# Patient Record
Sex: FEMALE | Race: WHITE | NOT HISPANIC OR LATINO | URBAN - METROPOLITAN AREA
[De-identification: names, ages, dates, MRNs, and addresses within clinical notes are randomized per-mention and may not be internally consistent; named-entity substitution may affect disease eponyms.]

---

## 2021-05-10 ENCOUNTER — OFFICE VISIT (OUTPATIENT)
Dept: URBAN - METROPOLITAN AREA CLINIC 78 | Facility: CLINIC | Age: 71
End: 2021-05-10
Payer: MEDICARE

## 2021-05-10 ENCOUNTER — DASHBOARD ENCOUNTERS (OUTPATIENT)
Age: 71
End: 2021-05-10

## 2021-05-10 DIAGNOSIS — R14.0 BLOATING: ICD-10-CM

## 2021-05-10 DIAGNOSIS — F41.9 ANXIETY: ICD-10-CM

## 2021-05-10 DIAGNOSIS — Z83.71 FAMILY HISTORY OF COLONIC POLYPS: ICD-10-CM

## 2021-05-10 DIAGNOSIS — Z86.010 PERSONAL HISTORY OF COLONIC POLYPS: ICD-10-CM

## 2021-05-10 DIAGNOSIS — R10.9 ABDOMINAL CRAMPING: ICD-10-CM

## 2021-05-10 DIAGNOSIS — E73.9 LACTOSE INTOLERANCE: ICD-10-CM

## 2021-05-10 PROBLEM — 48694002: Status: ACTIVE | Noted: 2021-05-10

## 2021-05-10 PROBLEM — 782415009: Status: ACTIVE | Noted: 2021-05-10

## 2021-05-10 PROCEDURE — 99214 OFFICE O/P EST MOD 30 MIN: CPT | Performed by: INTERNAL MEDICINE

## 2021-05-10 RX ORDER — SODIUM PICOSULFATE, MAGNESIUM OXIDE, AND ANHYDROUS CITRIC ACID 10; 3.5; 12 MG/160ML; G/160ML; G/160ML
160ML AS DIRECTED LIQUID ORAL ONCE
Qty: 1 | Refills: 0 | OUTPATIENT
Start: 2021-05-10 | End: 2021-05-11

## 2021-05-10 NOTE — HPI-TODAY'S VISIT:
The patient presents on referral from Tor Santiago MD , for a gastroenterology evaluation for surveillance colonoscopy.    The patient had a villous adenoma at 10cm from the anal verge requiring a LAR in 2000. She has since had recurrent surveillance colonoscopies.  She denies any abdominal pain, change in bowel habits or unintentional weight loss. No melena or hematochezia. Both her parents had had villous adenomas as well.  Following the aforementioned surgery, she had several issues with abdominal pain and bowel obstruction, requiring adhesiolysis and removal of fibroid tumors. She continued noticing postprandial diarrhea, bloating and abdominal pain, which eventually resolved after she went on a GFD. She eventually re-introduced some wheat, without any significant worsening in symptoms. She has found that lactose triggers some GI issues so she does watch out for this. She has used Lactaid or PeptomBismol. She has not had nearly as much bloating and gasiness, unless she overconsumes fiber.   She has a tremor and lots of anxiety, and is always trying to find ways to "calm herself down." She tried Buspar at the suggestion of her PCP.  She now only drinks water or decaffeinated green tea which has helped with her tremors/anxiety.   She has not had any issues with heartburn. She does not use any PPIs as she developed joint aches when taking these in the past.   The patient used to live in [place], [place] but has been living in the [place]. now.  She is due for a  surveillance colonoscopy.   Summary of prior workup: - Negative GI PCR panel on 1/23/20.  - Labs on 10/15/19: WBC 4.4, Hb 12.7, MCV 97, Plts 188.  - Labs on 8/7/19: WBC 3.0, Hb 13.4, MCV 94, Plts 174. Gluc 81, BUN 12, Cr 0.7, Na 138, K 4.1, Ca 9.6, TP 6.8, Alb 4.3, TB 1.0, AP 46, AST 22, ALT 29.   - Colonoscopy by me on 2/15/16 revealed a normal terminal ileum, 8 mm TA in the cecum, widely patent anastomosis and non-bleeding internal hemorrhoids. The quality of the prep was good. A repeat colonoscopy was advised in 5 years. - Celiac panel on 2/16/16 was negative.

## 2021-05-11 PROBLEM — 428283002: Status: ACTIVE | Noted: 2021-05-10

## 2021-08-04 ENCOUNTER — CLAIMS CREATED FROM THE CLAIM WINDOW (OUTPATIENT)
Dept: URBAN - METROPOLITAN AREA CLINIC 4 | Facility: CLINIC | Age: 71
End: 2021-08-04
Payer: MEDICARE

## 2021-08-04 ENCOUNTER — OFFICE VISIT (OUTPATIENT)
Dept: URBAN - METROPOLITAN AREA SURGERY CENTER 15 | Facility: SURGERY CENTER | Age: 71
End: 2021-08-04
Payer: MEDICARE

## 2021-08-04 DIAGNOSIS — K62.1 DYSPLASTIC POLYP OF RECTUM: ICD-10-CM

## 2021-08-04 DIAGNOSIS — K63.89 COLONIC PSEUDOMELANOSIS: ICD-10-CM

## 2021-08-04 DIAGNOSIS — Z86.010 H/O ADENOMATOUS POLYP OF COLON: ICD-10-CM

## 2021-08-04 PROCEDURE — 88305 TISSUE EXAM BY PATHOLOGIST: CPT | Performed by: PATHOLOGY

## 2021-08-04 PROCEDURE — G8907 PT DOC NO EVENTS ON DISCHARG: HCPCS | Performed by: INTERNAL MEDICINE

## 2021-08-04 PROCEDURE — 45385 COLONOSCOPY W/LESION REMOVAL: CPT | Performed by: INTERNAL MEDICINE
